# Patient Record
Sex: MALE | Race: WHITE | NOT HISPANIC OR LATINO | URBAN - METROPOLITAN AREA
[De-identification: names, ages, dates, MRNs, and addresses within clinical notes are randomized per-mention and may not be internally consistent; named-entity substitution may affect disease eponyms.]

---

## 2022-02-08 ENCOUNTER — OFFICE VISIT (OUTPATIENT)
Dept: URGENT CARE | Facility: CLINIC | Age: 47
End: 2022-02-08
Payer: COMMERCIAL

## 2022-02-08 VITALS
BODY MASS INDEX: 25.06 KG/M2 | DIASTOLIC BLOOD PRESSURE: 83 MMHG | SYSTOLIC BLOOD PRESSURE: 123 MMHG | RESPIRATION RATE: 16 BRPM | OXYGEN SATURATION: 98 % | WEIGHT: 185 LBS | HEIGHT: 72 IN | HEART RATE: 71 BPM | TEMPERATURE: 98 F

## 2022-02-08 DIAGNOSIS — S01.01XA LACERATION OF SCALP, INITIAL ENCOUNTER: Primary | ICD-10-CM

## 2022-02-08 PROCEDURE — 12001 RPR S/N/AX/GEN/TRNK 2.5CM/<: CPT | Mod: S$GLB,,, | Performed by: NURSE PRACTITIONER

## 2022-02-08 PROCEDURE — 12001 LACERATION REPAIR: ICD-10-PCS | Mod: S$GLB,,, | Performed by: NURSE PRACTITIONER

## 2022-02-08 PROCEDURE — 99204 OFFICE O/P NEW MOD 45 MIN: CPT | Mod: TIER,25,S$GLB, | Performed by: NURSE PRACTITIONER

## 2022-02-08 PROCEDURE — 99204 PR OFFICE/OUTPT VISIT, NEW, LEVL IV, 45-59 MIN: ICD-10-PCS | Mod: TIER,25,S$GLB, | Performed by: NURSE PRACTITIONER

## 2022-02-08 RX ORDER — MUPIROCIN 20 MG/G
OINTMENT TOPICAL
Qty: 22 G | Refills: 1 | Status: SHIPPED | OUTPATIENT
Start: 2022-02-08

## 2022-02-08 NOTE — PROGRESS NOTES
Subjective:       Patient ID: Jacob Lerma is a 46 y.o. male.    Vitals:  height is 6' (1.829 m) and weight is 83.9 kg (185 lb). His temperature is 97.9 °F (36.6 °C). His blood pressure is 123/83 and his pulse is 71. His respiration is 16 and oxygen saturation is 98%.     Chief Complaint: Laceration    Pt presents with c o laceration to top of head x 2 hours. 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 Last tetanus 3 years ago.  Pt has not treated sx thus far  Denies headache, blurred vision, LOC, N/V, dizziness.    Laceration   The incident occurred 1 to 3 hours ago. The laceration is located on the scalp. The laceration is 1 cm in size. The laceration mechanism was a blunt object. The patient is experiencing no pain. He reports no foreign bodies present. His tetanus status is UTD.       Gastrointestinal: Negative for nausea and vomiting.   Skin: Positive for laceration.   Neurological: Negative for dizziness, light-headedness, passing out, speech difficulty, headaches, disorientation, altered mental status, loss of consciousness, numbness and tingling.   Psychiatric/Behavioral: Negative for altered mental status and disorientation.       Objective:      Physical Exam   Constitutional: He is oriented to person, place, and time. He appears well-developed and well-nourished. He is cooperative.  Non-toxic appearance. He does not appear ill. No distress.   HENT:   Head: Normocephalic and atraumatic. Head is without abrasion, without contusion and without laceration.   Ears:   Right Ear: Hearing, tympanic membrane, external ear and ear canal normal. No hemotympanum.   Left Ear: Hearing, tympanic membrane, external ear and ear canal normal. No hemotympanum.   Nose: Nose normal. No mucosal edema, rhinorrhea or nasal deformity. No epistaxis. Right sinus exhibits no maxillary sinus tenderness and no frontal sinus tenderness. Left sinus exhibits no maxillary sinus  tenderness and no frontal sinus tenderness.   Mouth/Throat: Uvula is midline, oropharynx is clear and moist and mucous membranes are normal. No trismus in the jaw. Normal dentition. No uvula swelling. No posterior oropharyngeal erythema.   Eyes: Conjunctivae, EOM and lids are normal. Pupils are equal, round, and reactive to light. Right eye exhibits no discharge. Left eye exhibits no discharge. No scleral icterus.   Neck: Trachea normal and phonation normal. Neck supple. No tracheal deviation present. No neck rigidity present. No spinous process tenderness present. No muscular tenderness present.   Cardiovascular: Normal rate, regular rhythm, normal heart sounds, intact distal pulses and normal pulses.   Pulmonary/Chest: Effort normal and breath sounds normal. No respiratory distress.   Abdominal: Normal appearance and bowel sounds are normal. He exhibits no distension, no pulsatile midline mass and no mass. Soft. There is no abdominal tenderness.   Musculoskeletal: Normal range of motion.         General: No deformity or edema. Normal range of motion.   Neurological: He is alert and oriented to person, place, and time. He has normal strength. No cranial nerve deficit or sensory deficit. He exhibits normal muscle tone. He displays no seizure activity. Coordination normal. GCS eye subscore is 4. GCS verbal subscore is 5. GCS motor subscore is 6.   Skin: Skin is warm, dry, intact, not diaphoretic and not pale. Capillary refill takes less than 2 seconds. Lacerations - head:  ScalpNo abrasion, No burn, No bruising and No ecchymosis        Psychiatric: He has a normal mood and affect. His speech is normal and behavior is normal. Judgment and thought content normal. Cognition and memory  Nursing note and vitals reviewed.        Assessment:       1. Laceration of scalp, initial encounter          Plan:         Laceration of scalp, initial encounter  -     mupirocin (BACTROBAN) 2 % ointment; Apply to affected area 1-2 times  daily  Dispense: 22 g; Refill: 1  -     Laceration Repair      Patient Instructions                          Laceration Repair   If your condition worsens or fails to improve we recommend that you receive another evaluation at the ER immediately or contact your PCP to discuss your concerns or return here. You must understand that you've received an urgent care treatment only and that you may be released before all your medical problems are known or treated. You the patient will arrange for followup care as instructed.   Use the prescription antibiotic ointment for 2-3 days only. Clean the wound twice a day with betadiene and apply the ointment. After that, only use a dry bandage as the ointment will keep the laceration too moist.   Staple removal in about 10 days.   Monitor for signs of infection such as redness, drainage, increase swelling or increase pain.   If you were given narcotics for pain do not drive or operate heavy equipment or machinery.   Tylenol or ibuprofen can also be used as directed for pain unless you have an allergy to them or medical condition such as stomach ulcers, kidney or liver disease or blood thinners etc for which you should not be taking these type of medications.       Patient Education       Laceration Repair With Staples Discharge Instructions   About this topic   A laceration is a cut on your skin. It is most often caused by a sharp object like blades, glass, or from things with sharp edges. Sometimes, this kind of cut is shallow. Other times, it goes deep into the skin and muscles. Before the cut can be closed, it must be cleaned. This is called a laceration repair.  Doctors closed the cut on your skin with a special kind of metal staples. In a week or so, a doctor has to take out the staples. The staff may have given you a staple remover to bring to your follow-up doctors visit.       What care is needed at home?   · Ask your doctor what you need to do when you go home. Make sure  you ask questions if you do not understand what the doctor says.  · Keep your wound clean and dry for the first 24 hours. After 24 hours, you can gently wash the wound with soap and water or take a shower. Do not soak your wound by bathing or swimming until the staples have been removed.  · You may apply an antibiotic ointment to the wound 1 to 2 times each day. If you want, you can cover your wound with a bandage. You can also leave it open to air if you prefer.  · Wash your hands before and after you touch your wound or bandage.  · Do not try to take out the staples yourself. Your staples need to be removed on _______________________.  · Avoid activities that could hurt the area of your wound for a few weeks. If you hurt the same part of your body again, the wound can open up.  What follow-up care is needed?   · Your doctor may ask you to make visits to the office to check on your progress. Be sure to keep these visits.  · You will need to have your staples taken out in 7 to 14 days. Ask the doctor when you need to come back to have them taken out.  What drugs may be needed?   The doctor may order drugs to:  · Help with pain  · Prevent or fight an infection  Will physical activity be limited?   · Limit your activity until your wound is fully healed. Talk to your doctor about the right amount of activity for you.  · Your doctor will tell you when it is safe to return to sports. Be sure to ask your doctor before you do any activities.  What problems could happen?   · Bleeding  · Infection  · Poor wound healing  · Scarring  When do I need to call the doctor?   · You have a fever of 100.4°F (38°C) or higher or chills.  · Your wound is swollen, red, or warm.  · Your wound has thick yellow or green drainage.  · The wound opens up again.  Teach Back: Helping You Understand   The Teach Back Method helps you understand the information we are giving you. After you talk with the staff, tell them in your own words what you  learned. This helps to make sure the staff has described each thing clearly. It also helps to explain things that may have been confusing. Before going home, make sure you can do these:  · I can tell you about my procedure.  · I can tell you how to care for my wound.  · I can tell you when my staples need to be taken out by the doctor.  · I can tell you what I will do if I have swelling, redness, or warmth around my wound.  Last Reviewed Date   2021-06-18  Consumer Information Use and Disclaimer   This information is not specific medical advice and does not replace information you receive from your health care provider. This is only a brief summary of general information. It does NOT include all information about conditions, illnesses, injuries, tests, procedures, treatments, therapies, discharge instructions or life-style choices that may apply to you. You must talk with your health care provider for complete information about your health and treatment options. This information should not be used to decide whether or not to accept your health care providers advice, instructions or recommendations. Only your health care provider has the knowledge and training to provide advice that is right for you.  Copyright   Copyright © 2021 [employer] and its affiliates and/or licensors. All rights reserved.

## 2022-02-08 NOTE — PATIENT INSTRUCTIONS
Laceration Repair   If your condition worsens or fails to improve we recommend that you receive another evaluation at the ER immediately or contact your PCP to discuss your concerns or return here. You must understand that you've received an urgent care treatment only and that you may be released before all your medical problems are known or treated. You the patient will arrange for followup care as instructed.   Use the prescription antibiotic ointment for 2-3 days only. Clean the wound twice a day with betadiene and apply the ointment. After that, only use a dry bandage as the ointment will keep the laceration too moist.   Staple removal in about 10 days.   Monitor for signs of infection such as redness, drainage, increase swelling or increase pain.   If you were given narcotics for pain do not drive or operate heavy equipment or machinery.   Tylenol or ibuprofen can also be used as directed for pain unless you have an allergy to them or medical condition such as stomach ulcers, kidney or liver disease or blood thinners etc for which you should not be taking these type of medications.       Patient Education       Laceration Repair With Staples Discharge Instructions   About this topic   A laceration is a cut on your skin. It is most often caused by a sharp object like blades, glass, or from things with sharp edges. Sometimes, this kind of cut is shallow. Other times, it goes deep into the skin and muscles. Before the cut can be closed, it must be cleaned. This is called a laceration repair.  Doctors closed the cut on your skin with a special kind of metal staples. In a week or so, a doctor has to take out the staples. The staff may have given you a staple remover to bring to your follow-up doctors visit.       What care is needed at home?   · Ask your doctor what you need to do when you go home. Make sure you ask questions if you do not understand what the doctor says.  · Keep your wound  clean and dry for the first 24 hours. After 24 hours, you can gently wash the wound with soap and water or take a shower. Do not soak your wound by bathing or swimming until the staples have been removed.  · You may apply an antibiotic ointment to the wound 1 to 2 times each day. If you want, you can cover your wound with a bandage. You can also leave it open to air if you prefer.  · Wash your hands before and after you touch your wound or bandage.  · Do not try to take out the staples yourself. Your staples need to be removed on _______________________.  · Avoid activities that could hurt the area of your wound for a few weeks. If you hurt the same part of your body again, the wound can open up.  What follow-up care is needed?   · Your doctor may ask you to make visits to the office to check on your progress. Be sure to keep these visits.  · You will need to have your staples taken out in 7 to 14 days. Ask the doctor when you need to come back to have them taken out.  What drugs may be needed?   The doctor may order drugs to:  · Help with pain  · Prevent or fight an infection  Will physical activity be limited?   · Limit your activity until your wound is fully healed. Talk to your doctor about the right amount of activity for you.  · Your doctor will tell you when it is safe to return to sports. Be sure to ask your doctor before you do any activities.  What problems could happen?   · Bleeding  · Infection  · Poor wound healing  · Scarring  When do I need to call the doctor?   · You have a fever of 100.4°F (38°C) or higher or chills.  · Your wound is swollen, red, or warm.  · Your wound has thick yellow or green drainage.  · The wound opens up again.  Teach Back: Helping You Understand   The Teach Back Method helps you understand the information we are giving you. After you talk with the staff, tell them in your own words what you learned. This helps to make sure the staff has described each thing clearly. It also  helps to explain things that may have been confusing. Before going home, make sure you can do these:  · I can tell you about my procedure.  · I can tell you how to care for my wound.  · I can tell you when my staples need to be taken out by the doctor.  · I can tell you what I will do if I have swelling, redness, or warmth around my wound.  Last Reviewed Date   2021-06-18  Consumer Information Use and Disclaimer   This information is not specific medical advice and does not replace information you receive from your health care provider. This is only a brief summary of general information. It does NOT include all information about conditions, illnesses, injuries, tests, procedures, treatments, therapies, discharge instructions or life-style choices that may apply to you. You must talk with your health care provider for complete information about your health and treatment options. This information should not be used to decide whether or not to accept your health care providers advice, instructions or recommendations. Only your health care provider has the knowledge and training to provide advice that is right for you.  Copyright   Copyright © 2021 [employer] and its affiliates and/or licensors. All rights reserved.

## 2022-02-08 NOTE — PROCEDURES
"Laceration Repair    Date/Time: 2022 4:30 PM  Performed by: Nataly Mar NP  Authorized by: Nataly Mar NP   Consent Done: Yes  Consent: Verbal consent obtained.  Risks and benefits: risks, benefits and alternatives were discussed  Consent given by: patient  Patient understanding: patient states understanding of the procedure being performed  Patient identity confirmed: , MRN, name, provided demographic data and verbally with patient  Time out: Immediately prior to procedure a "time out" was called to verify the correct patient, procedure, equipment, support staff and site/side marked as required.  Body area: head/neck  Location details: scalp  Laceration length: 1.5 cm  Foreign bodies: no foreign bodies  Tendon involvement: none  Nerve involvement: none  Vascular damage: no  Anesthesia: local infiltration    Anesthesia:  Local Anesthetic: lidocaine 1% without epinephrine  Anesthetic total: 2 mL    Patient sedated: no  Irrigation solution: saline  Irrigation method: syringe  Amount of cleaning: standard  Debridement: none  Degree of undermining: none  Skin closure: staples (3)  Technique: simple  Approximation: close  Approximation difficulty: simple  Dressing: antibiotic ointment  Patient tolerance: Patient tolerated the procedure well with no immediate complications        "

## 2022-02-18 ENCOUNTER — CLINICAL SUPPORT (OUTPATIENT)
Dept: URGENT CARE | Facility: CLINIC | Age: 47
End: 2022-02-18
Payer: COMMERCIAL

## 2022-02-18 VITALS
HEART RATE: 77 BPM | OXYGEN SATURATION: 97 % | TEMPERATURE: 98 F | HEIGHT: 72 IN | SYSTOLIC BLOOD PRESSURE: 128 MMHG | WEIGHT: 185 LBS | DIASTOLIC BLOOD PRESSURE: 79 MMHG | BODY MASS INDEX: 25.06 KG/M2 | RESPIRATION RATE: 16 BRPM

## 2022-02-18 DIAGNOSIS — Z48.02 ENCOUNTER FOR STAPLE REMOVAL: Primary | ICD-10-CM

## 2022-02-18 PROCEDURE — 99499 NO LOS: ICD-10-PCS | Mod: S$GLB,,,

## 2022-02-18 PROCEDURE — 99024 SUTURE REMOVAL: ICD-10-PCS | Mod: S$GLB,,,

## 2022-02-18 PROCEDURE — 99024 POSTOP FOLLOW-UP VISIT: CPT | Mod: S$GLB,,,

## 2022-02-18 PROCEDURE — 99499 UNLISTED E&M SERVICE: CPT | Mod: S$GLB,,,

## 2022-02-18 NOTE — PATIENT INSTRUCTIONS
- Follow up with your PCP or specialty clinic as directed in the next 1-2 weeks if not improved or as needed.  You can call [phone] to schedule an appointment with the appropriate provider.    - Go to the ER or seek medical attention immediately if you develop new or worsening symptoms.    - You must understand that you have received an Urgent Care treatment only and that you may be released before all of your medical problems are known or treated.   - You, the patient, will arrange for follow up care as instructed.   - If your condition worsens or fails to improve we recommend that you receive another evaluation at the ER immediately or contact your PCP to discuss your concerns or return here.     Patient Education       Staple Removal   Why is this procedure done?   The doctor sometimes uses special metal staples to close a skin cut or wound. Staples let the skin heal the right way. The staples hold the skin cut together. Staples fix cuts on the outside of the skin. The doctor takes the staples out when the cut is healed all the way. The staples must come out since they are metal.  Most often, staples are taken out within 7 to 10 days. This will depend on how bad the cut is. It will also depend on where the cut is on your body. Staples that stay in too long may cause scars. They can also be hard to take out. The cut may open up again if the staples are not in long enough.     What happens during the procedure?   The doctor cleans the wound and uses a special tool to  the staple. This raises the staple away from the skin and opens it up. The tool pulls the staple out of the skin. The doctor will repeat this until all the staples are gone. Sometimes the doctor will only take out every other staple and remove the rest at a later time. The doctor will clean the cut site. Small strips or a special tape may be placed over the cut to protect it.  What care is needed at home?   · Leave the tape strips over  the cut until they fall off or as directed by your doctor.  · Talk to your doctor about how to care for your wound. Ask your doctor about:  ? When you should change your bandages  ? When you may take a bath or shower  ? If you need to be careful with lifting things over 10 pounds (4.5 kg)  ? When you may go back to your normal activities like work or driving  What follow-up care is needed?   Your doctor may ask you to make visits to the office to check on your progress. Be sure to keep these visits.  What problems could happen?   · Infection  · Wound reopens  · Scarring  · Pain  · Large, firm scar tissue forms. This is a keloid and is more often seen in -Americans.  What can be done to prevent this health problem?   · Be sure to follow up and have your staples taken out as directed.  · Follow proper wound care to avoid infection.  · Protect the wound from being reinjured.  · Certain health problems like high blood sugar or long-term steroid use may affect wound healing. Make sure you take all drugs as ordered by your doctor.  · Do not pull on or pick at the staples or tape strips.  When do I need to call the doctor?   · Signs of infection. These include a fever of 100.4°F (38°C) or higher, chills, wound that will not heal.  · Signs of wound infection. These include swelling, redness, warmth around the wound; too much pain when touched; yellowish, greenish, or bloody discharge; foul smell coming from the cut site; cut site opens up.  · Any of your staples come out before the time scheduled to take them out  Last Reviewed Date   2021-10-06  Consumer Information Use and Disclaimer   This information is not specific medical advice and does not replace information you receive from your health care provider. This is only a brief summary of general information. It does NOT include all information about conditions, illnesses, injuries, tests, procedures, treatments, therapies, discharge instructions or life-style  choices that may apply to you. You must talk with your health care provider for complete information about your health and treatment options. This information should not be used to decide whether or not to accept your health care providers advice, instructions or recommendations. Only your health care provider has the knowledge and training to provide advice that is right for you.  Copyright   Copyright © 2021 [employer] and its affiliates and/or licensors. All rights reserved.

## 2022-02-18 NOTE — PROGRESS NOTES
Subjective:       Patient ID: Jacob Lerma is a 46 y.o. male.    Vitals:  height is 6' (1.829 m) and weight is 83.9 kg (185 lb). His temperature is 98.1 °F (36.7 °C). His blood pressure is 128/79 and his pulse is 77. His respiration is 16 and oxygen saturation is 97%.     Chief Complaint: Suture / Staple Removal    Pt is a 45 y/o male who presents for staple removal from scalp. Staples placed 2/8/22. Denies pain, redness, swelling, fever.    Suture / Staple Removal  The sutures were placed 7 to 10 days ago. He tried nothing since the wound repair. His temperature was unmeasured prior to arrival. There has been no drainage from the wound. There is no redness present. There is no swelling present. There is no pain present.       Constitution: Negative for chills and fever.   Eyes: Negative for photophobia, vision loss, double vision and blurred vision.   Skin: Negative for erythema.   Neurological: Negative for dizziness, light-headedness, facial drooping, speech difficulty, coordination disturbances, loss of balance, headaches, disorientation, altered mental status, numbness and tingling.   Psychiatric/Behavioral: Negative for altered mental status, disorientation and confusion.       Objective:      Physical Exam   Constitutional: He is oriented to person, place, and time. He appears well-developed and well-nourished.   HENT:   Head: Normocephalic and atraumatic. Head is without abrasion, without contusion and without laceration.   Ears:   Right Ear: External ear normal.   Left Ear: External ear normal.   Nose: Nose normal.   Mouth/Throat: Oropharynx is clear and moist and mucous membranes are normal.   Eyes: Conjunctivae, EOM and lids are normal. Pupils are equal, round, and reactive to light.   Neck: Trachea normal and phonation normal. Neck supple.   Cardiovascular: Normal rate, regular rhythm and normal heart sounds.   Pulmonary/Chest: Effort normal and breath sounds normal. No stridor. No respiratory distress.    Musculoskeletal: Normal range of motion.         General: Normal range of motion.   Neurological: He is alert and oriented to person, place, and time.   Skin: Skin is warm, dry, intact and no rash. Capillary refill takes less than 2 seconds. No abrasion, No burn, No bruising, No erythema and No ecchymosis        Psychiatric: He has a normal mood and affect. His speech is normal and behavior is normal. Judgment and thought content normal. Cognition and memory  Nursing note and vitals reviewed.        Assessment:       1. Encounter for staple removal          Plan:         Encounter for staple removal           Medical Decision Making:   Initial Assessment:   Pt is a 45 y/o male who presents for staple removal from his scalp. VSS. Well-healing wound with no signs of infection.  Urgent Care Management:  Three staples removed. Return precautions for worsening symptoms or signs of infection, such as fever, redness, swelling, purulent drainage, worsening pain. F/up with PCP. Pt verbalizes understanding and agrees with plan.         Patient Instructions   - Follow up with your PCP or specialty clinic as directed in the next 1-2 weeks if not improved or as needed.  You can call [phone] to schedule an appointment with the appropriate provider.    - Go to the ER or seek medical attention immediately if you develop new or worsening symptoms.    - You must understand that you have received an Urgent Care treatment only and that you may be released before all of your medical problems are known or treated.   - You, the patient, will arrange for follow up care as instructed.   - If your condition worsens or fails to improve we recommend that you receive another evaluation at the ER immediately or contact your PCP to discuss your concerns or return here.     Patient Education       Staple Removal   Why is this procedure done?   The doctor sometimes uses special metal staples to close a skin cut or wound. [place] let the  skin heal the right way. The staples hold the skin cut together. Staples fix cuts on the outside of the skin. The doctor takes the staples out when the cut is healed all the way. The staples must come out since they are metal.  Most often, staples are taken out within 7 to 10 days. This will depend on how bad the cut is. It will also depend on where the cut is on your body. Staples that stay in too long may cause scars. They can also be hard to take out. The cut may open up again if the staples are not in long enough.     What happens during the procedure?   The doctor cleans the wound and uses a special tool to  the staple. This raises the staple away from the skin and opens it up. The tool pulls the staple out of the skin. The doctor will repeat this until all the staples are gone. Sometimes the doctor will only take out every other staple and remove the rest at a later time. The doctor will clean the cut site. Small strips or a special tape may be placed over the cut to protect it.  What care is needed at home?   · Leave the tape strips over the cut until they fall off or as directed by your doctor.  · Talk to your doctor about how to care for your wound. Ask your doctor about:  ? When you should change your bandages  ? When you may take a bath or shower  ? If you need to be careful with lifting things over 10 pounds (4.5 kg)  ? When you may go back to your normal activities like work or driving  What follow-up care is needed?   Your doctor may ask you to make visits to the office to check on your progress. Be sure to keep these visits.  What problems could happen?   · Infection  · Wound reopens  · Scarring  · Pain  · Large, firm scar tissue forms. This is a keloid and is more often seen in -Americans.  What can be done to prevent this health problem?   · Be sure to follow up and have your staples taken out as directed.  · Follow proper wound care to avoid infection.  · Protect the wound from being  reinjured.  · Certain health problems like high blood sugar or long-term steroid use may affect wound healing. Make sure you take all drugs as ordered by your doctor.  · Do not pull on or pick at the staples or tape strips.  When do I need to call the doctor?   · Signs of infection. These include a fever of 100.4°F (38°C) or higher, chills, wound that will not heal.  · Signs of wound infection. These include swelling, redness, warmth around the wound; too much pain when touched; yellowish, greenish, or bloody discharge; foul smell coming from the cut site; cut site opens up.  · Any of your staples come out before the time scheduled to take them out  Last Reviewed Date   2021-10-06  Consumer Information Use and Disclaimer   This information is not specific medical advice and does not replace information you receive from your health care provider. This is only a brief summary of general information. It does NOT include all information about conditions, illnesses, injuries, tests, procedures, treatments, therapies, discharge instructions or life-style choices that may apply to you. You must talk with your health care provider for complete information about your health and treatment options. This information should not be used to decide whether or not to accept your health care providers advice, instructions or recommendations. Only your health care provider has the knowledge and training to provide advice that is right for you.  Copyright   Copyright © 2021 [employer] and its affiliates and/or licensors. All rights reserved.

## 2022-02-18 NOTE — PROCEDURES
Suture Removal    Date/Time: 2/18/2022 11:30 AM  Location procedure was performed: [facility]  Performed by: Dillan Crawley PA-C  Authorized by: Dillan Crawley PA-C   Assisting provider: Dillan Crawley PA-C  Pre-operative diagnosis: Scalp laceration  Post-operative diagnosis: Scalp laceration  Body area: head/neck  Location details: scalp  Description of findings: Well-healing wound with no signs of infection.   Wound Appearance: clean, well healed, nonpurulent and no drainage  Staples Removed: 3  Facility: sutures placed in this facility  Technical procedures used: N/A  Significant surgical tasks conducted by the assistant(s): N/A  Complications: No  Estimated blood loss (mL): 0  Specimens: No  Implants: No  Patient tolerance: Patient tolerated the procedure well with no immediate complications